# Patient Record
Sex: FEMALE | Race: WHITE | NOT HISPANIC OR LATINO | URBAN - METROPOLITAN AREA
[De-identification: names, ages, dates, MRNs, and addresses within clinical notes are randomized per-mention and may not be internally consistent; named-entity substitution may affect disease eponyms.]

---

## 2022-09-12 ENCOUNTER — APPOINTMENT (RX ONLY)
Dept: URBAN - METROPOLITAN AREA CLINIC 142 | Facility: CLINIC | Age: 72
Setting detail: DERMATOLOGY
End: 2022-09-12

## 2022-09-12 DIAGNOSIS — L29.89 OTHER PRURITUS: ICD-10-CM

## 2022-09-12 DIAGNOSIS — L259 CONTACT DERMATITIS AND OTHER ECZEMA, UNSPECIFIED CAUSE: ICD-10-CM

## 2022-09-12 PROBLEM — L23.9 ALLERGIC CONTACT DERMATITIS, UNSPECIFIED CAUSE: Status: ACTIVE | Noted: 2022-09-12

## 2022-09-12 PROBLEM — L29.8 OTHER PRURITUS: Status: ACTIVE | Noted: 2022-09-12

## 2022-09-12 PROCEDURE — 99212 OFFICE O/P EST SF 10 MIN: CPT | Mod: 25

## 2022-09-12 PROCEDURE — 95044 PATCH/APPLICATION TESTS: CPT

## 2022-09-12 PROCEDURE — ? COUNSELING

## 2022-09-12 PROCEDURE — ? PATCH TESTING

## 2022-09-12 ASSESSMENT — LOCATION SIMPLE DESCRIPTION DERM
LOCATION SIMPLE: LEFT LIP
LOCATION SIMPLE: LEFT UPPER BACK

## 2022-09-12 ASSESSMENT — LOCATION ZONE DERM
LOCATION ZONE: TRUNK
LOCATION ZONE: LIP

## 2022-09-12 ASSESSMENT — LOCATION DETAILED DESCRIPTION DERM
LOCATION DETAILED: LEFT INFERIOR VERMILION LIP
LOCATION DETAILED: LEFT SUPERIOR VERMILION LIP
LOCATION DETAILED: LEFT SUPERIOR MEDIAL UPPER BACK

## 2022-09-12 NOTE — PROCEDURE: PATCH TESTING
Consent: Written consent obtained, risks reviewed including but not limited to rash, itching, allergic reaction, systemic rash, remote possiblity of anaphylaxis to allergen.
Number Of Patches (Maximum Allowable Per Dos By Cms Is 90): 1189 
Post-Care Instructions: I reviewed with the patient in detail post-care instructions. Patient should not sweat, pick at, or get the patches wet for 48 hours.
Detail Level: None

## 2022-09-12 NOTE — HPI: TESTING (PATCH TESTING)
Has Your Rash Been Biopsied Before?: has not been biopsied previously
Have You Had Previous Patch Testing In The Past?: [employer] series
How Severe Is Your Rash At Its Worst?: moderate

## 2022-09-14 ENCOUNTER — APPOINTMENT (RX ONLY)
Dept: URBAN - METROPOLITAN AREA CLINIC 142 | Facility: CLINIC | Age: 72
Setting detail: DERMATOLOGY
End: 2022-09-14

## 2022-09-14 DIAGNOSIS — L29.89 OTHER PRURITUS: ICD-10-CM

## 2022-09-14 DIAGNOSIS — L259 CONTACT DERMATITIS AND OTHER ECZEMA, UNSPECIFIED CAUSE: ICD-10-CM | Status: WORSENING

## 2022-09-14 PROBLEM — L23.5 ALLERGIC CONTACT DERMATITIS DUE TO OTHER CHEMICAL PRODUCTS: Status: ACTIVE | Noted: 2022-09-14

## 2022-09-14 PROBLEM — L29.8 OTHER PRURITUS: Status: ACTIVE | Noted: 2022-09-14

## 2022-09-14 PROCEDURE — 99213 OFFICE O/P EST LOW 20 MIN: CPT

## 2022-09-14 PROCEDURE — ? COUNSELING

## 2022-09-14 PROCEDURE — ? NORTH AMERICAN 80 PATCH TEST READING

## 2022-09-14 ASSESSMENT — PAIN INTENSITY VAS: HOW INTENSE IS YOUR PAIN 0 BEING NO PAIN, 10 BEING THE MOST SEVERE PAIN POSSIBLE?: NO PAIN

## 2022-09-14 ASSESSMENT — LOCATION ZONE DERM: LOCATION ZONE: LIP

## 2022-09-14 ASSESSMENT — SEVERITY ASSESSMENT: SEVERITY: MILD

## 2022-09-14 ASSESSMENT — BSA RASH: BSA RASH: 1

## 2022-09-14 ASSESSMENT — ITCH NUMERIC RATING SCALE: HOW SEVERE IS YOUR ITCHING?: 2

## 2022-09-14 ASSESSMENT — LOCATION SIMPLE DESCRIPTION DERM: LOCATION SIMPLE: LEFT LIP

## 2022-09-14 ASSESSMENT — LOCATION DETAILED DESCRIPTION DERM
LOCATION DETAILED: LEFT SUPERIOR VERMILION LIP
LOCATION DETAILED: LEFT INFERIOR VERMILION LIP

## 2022-09-14 NOTE — PROCEDURE: [employer] 80 PATCH TEST READING
Allergen 41 Reaction: no reaction
Allergen 54 Reaction: 1+
What Reading Time Point?: 48 hour
Name Of Allergen 11: Disperse Red 17
Show Negative Results In The Note?: No
Show Allergen Counseling In The Note?: Yes
Name Of Allergen 54: Isoeugenol
Detail Level: Zone
Number Of Patches Read: [address] [facility]
Allergen 11 Reaction: irritant reaction

## 2022-09-16 ENCOUNTER — APPOINTMENT (RX ONLY)
Dept: URBAN - METROPOLITAN AREA CLINIC 141 | Facility: CLINIC | Age: 72
Setting detail: DERMATOLOGY
End: 2022-09-16

## 2022-09-16 DIAGNOSIS — L259 CONTACT DERMATITIS AND OTHER ECZEMA, UNSPECIFIED CAUSE: ICD-10-CM | Status: WORSENING

## 2022-09-16 PROBLEM — L23.5 ALLERGIC CONTACT DERMATITIS DUE TO OTHER CHEMICAL PRODUCTS: Status: ACTIVE | Noted: 2022-09-16

## 2022-09-16 PROCEDURE — ? COUNSELING

## 2022-09-16 PROCEDURE — ? NORTH AMERICAN 80 PATCH TEST READING

## 2022-09-16 PROCEDURE — 99213 OFFICE O/P EST LOW 20 MIN: CPT

## 2022-09-16 ASSESSMENT — SEVERITY ASSESSMENT: SEVERITY: MILD

## 2022-09-16 ASSESSMENT — LOCATION SIMPLE DESCRIPTION DERM: LOCATION SIMPLE: LEFT LIP

## 2022-09-16 ASSESSMENT — LOCATION DETAILED DESCRIPTION DERM: LOCATION DETAILED: LEFT SUPERIOR VERMILION LIP

## 2022-09-16 ASSESSMENT — PAIN INTENSITY VAS: HOW INTENSE IS YOUR PAIN 0 BEING NO PAIN, 10 BEING THE MOST SEVERE PAIN POSSIBLE?: NO PAIN

## 2022-09-16 ASSESSMENT — ITCH NUMERIC RATING SCALE: HOW SEVERE IS YOUR ITCHING?: 2

## 2022-09-16 ASSESSMENT — LOCATION ZONE DERM: LOCATION ZONE: LIP

## 2022-09-16 NOTE — PROCEDURE: [employer] 80 PATCH TEST READING
Allergen 41 Reaction: no reaction
Allergen 54 Reaction: +/-
What Reading Time Point?: 72 hour
Name Of Allergen 11: Disperse Red 17
Show Negative Results In The Note?: No
Show Allergen Counseling In The Note?: Yes
Allergen 53 Reaction: 1+
Name Of Allergen 54: Isoeugenol
Detail Level: Zone
Number Of Patches Read: [address] [facility]
Allergen 11 Reaction: 2+

## 2022-09-16 NOTE — PROCEDURE: COUNSELING
Patient Specific Counseling (Will Not Stick From Patient To Patient): Patient advise to d/c AQUAPHOR OINTMENT. \\nSamples of Cerave ointment provided - safe list recommendation. \\nWe went over the results of the testing in a great detail. Patient was given the list of lip products that will be able to utilize, \\nFollow up in 8-12 weeks or sooner if needed.
Detail Level: Simple
Detail Level: Detailed

## 2023-01-16 ENCOUNTER — APPOINTMENT (RX ONLY)
Dept: URBAN - METROPOLITAN AREA CLINIC 142 | Facility: CLINIC | Age: 73
Setting detail: DERMATOLOGY
End: 2023-01-16

## 2023-01-16 DIAGNOSIS — L81.4 OTHER MELANIN HYPERPIGMENTATION: ICD-10-CM

## 2023-01-16 DIAGNOSIS — L98.8 OTHER SPECIFIED DISORDERS OF THE SKIN AND SUBCUTANEOUS TISSUE: ICD-10-CM

## 2023-01-16 PROCEDURE — ? BOTOX

## 2023-01-16 PROCEDURE — ? COUNSELING

## 2023-01-16 PROCEDURE — ? FILLERS

## 2023-01-16 PROCEDURE — ? ADDITIONAL NOTES

## 2023-01-16 PROCEDURE — 99212 OFFICE O/P EST SF 10 MIN: CPT

## 2023-01-16 ASSESSMENT — LOCATION DETAILED DESCRIPTION DERM: LOCATION DETAILED: RIGHT UPPER CUTANEOUS LIP

## 2023-01-16 ASSESSMENT — LOCATION SIMPLE DESCRIPTION DERM: LOCATION SIMPLE: RIGHT LIP

## 2023-01-16 ASSESSMENT — LOCATION ZONE DERM: LOCATION ZONE: LIP

## 2023-01-16 NOTE — PROCEDURE: ADDITIONAL NOTES
Render Risk Assessment In Note?: no
Detail Level: Simple
Additional Notes: Discussed in detail with patient that filler and Botox can help with muscles movement and softening of fine lines and wrinkles, but can not fully take care of patient's skin laxity and what she is actually desiring. Patient aware most of her issue are from collagen loss and can be treated with possible facelift. But what she wants done at this point in time is a temporary fix with only softening of deep lines  and wrinkles around mouth, marionette and upper cheeks and decreasing muscle movement around crows and glabella area - and may not necessarily eliminating them completely. Both patient and provider agreed to move on with cosmetic procedure today.

## 2023-01-16 NOTE — PROCEDURE: COUNSELING
Detail Level: Detailed
Patient Specific Counseling (Will Not Stick From Patient To Patient): Cryotherapy as a courtesy - NO CHARGE
Detail Level: Zone
Sunscreen Recommendations: Discussed sunblock should be applied to exposed areas daily, and be reapplied every two hours while exposed . The sunblock should be broad spectrum SPF-50, UVA/UVB and contain Zinc and Titanium Dioxide(Blue Lizard & Marianne Bowl Bum are some good OTC brands). Strongly recommend Elta MD products. Recommend sun protective clothing such as long sleeve UPF protective shirts, wide brim hats, neck covers and sunglasses as it has been proven to prevent further photo damage from the sun.

## 2023-01-16 NOTE — PROCEDURE: BOTOX
Inferior Lateral Orbicularis Oculi Units: 0
Show Lcl Units: No
Detail Level: Detailed
Show Additional Area 2: Yes
Glabellar Complex Units: [address] [facility]
Post-Care Instructions: Patient instructed to not lie down for 4 hours and limit physical activity for 24 hours.
Dilution (U/0.1 Cc): 4
Show Inventory Tab: Show
Additional Area 1 Units: [address] [facility]
Additional Area 1 Location: [facility]
Consent: Written consent obtained. Risks include but not limited to lid/brow ptosis, bruising, swelling, diplopia, temporary effect, incomplete chemical denervation.

## 2023-01-16 NOTE — PROCEDURE: FILLERS
Number Of Syringes (Required For Inventory): 1
Additional Area 3 Volume In Cc: 0
Anesthesia Volume In Cc: 0.5
Include Cannula Information In Note?: No
Filler: Juvederm Ultra XC
Inventory Information: This plan will send filler information to inventory based on the fillers you select. Multiple fillers can be sent but you must ensure you select the appropriate fillers in the inventory tab.
Detail Level: Detailed
Show Inventory Tab: Show
Consent: Written consent obtained. Risks include but not limited to bruising, beading, irregular texture, ulceration, infection, allergic reaction, scar formation, incomplete augmentation, temporary nature, procedural pain.
Filler Comments: Lot [id number] exp 2023-09-11
Post-Care Instructions: Patient instructed to apply ice to reduce swelling.
Map Statment: See [address] for Complete Details
Additional Anesthesia Volume In Cc: 6
Anesthesia Type: 1% lidocaine with epinephrine
Topical Anesthesia?: PLAST (20% benzocaine, 8% lidocaine, 4% tetracaine)

## 2023-01-19 ENCOUNTER — RX ONLY (OUTPATIENT)
Age: 73
Setting detail: RX ONLY
End: 2023-01-19

## 2023-01-19 RX ORDER — DOXYCYCLINE HYCLATE 100 MG/1
CAPSULE, GELATIN COATED ORAL
Qty: 20 | Refills: 0 | Status: ERX | COMMUNITY
Start: 2023-01-19

## 2023-01-19 RX ORDER — DOXYCYCLINE HYCLATE 100 MG/1
CAPSULE, GELATIN COATED ORAL
Qty: 20 | Refills: 0 | Status: ERX

## 2023-01-30 ENCOUNTER — APPOINTMENT (RX ONLY)
Dept: URBAN - METROPOLITAN AREA CLINIC 142 | Facility: CLINIC | Age: 73
Setting detail: DERMATOLOGY
End: 2023-01-30

## 2023-01-30 DIAGNOSIS — L98.8 OTHER SPECIFIED DISORDERS OF THE SKIN AND SUBCUTANEOUS TISSUE: ICD-10-CM

## 2023-01-30 DIAGNOSIS — Z41.9 ENCOUNTER FOR PROCEDURE FOR PURPOSES OTHER THAN REMEDYING HEALTH STATE, UNSPECIFIED: ICD-10-CM

## 2023-01-30 PROCEDURE — ? ADDITIONAL NOTES

## 2023-01-30 PROCEDURE — 99212 OFFICE O/P EST SF 10 MIN: CPT | Mod: NC

## 2023-01-30 PROCEDURE — ? BOTOX

## 2023-01-30 PROCEDURE — ? COUNSELING

## 2023-01-30 ASSESSMENT — LOCATION ZONE DERM
LOCATION ZONE: FACE
LOCATION ZONE: LEG
LOCATION ZONE: LIP

## 2023-01-30 ASSESSMENT — LOCATION SIMPLE DESCRIPTION DERM
LOCATION SIMPLE: LEFT LIP
LOCATION SIMPLE: LEFT CHEEK
LOCATION SIMPLE: RIGHT CHEEK
LOCATION SIMPLE: RIGHT LIP
LOCATION SIMPLE: LEFT POSTERIOR THIGH

## 2023-01-30 ASSESSMENT — LOCATION DETAILED DESCRIPTION DERM
LOCATION DETAILED: RIGHT ORAL COMMISSURE
LOCATION DETAILED: RIGHT INFERIOR CENTRAL MALAR CHEEK
LOCATION DETAILED: LEFT INFERIOR CENTRAL MALAR CHEEK
LOCATION DETAILED: LEFT DISTAL POSTERIOR THIGH
LOCATION DETAILED: LEFT ORAL COMMISSURE

## 2023-01-30 NOTE — PROCEDURE: BOTOX
Left Pupillary Line Units: 0
Show Additional Area 1: Yes
Consent: Written consent obtained. Risks include but not limited to lid/brow ptosis, bruising, swelling, diplopia, temporary effect, incomplete chemical denervation.
Show Ucl Units: No
Incrementing Botox Units: By 0.5 Units
Post-Care Instructions: Patient instructed to not lie down for 4 hours and limit physical activity for 24 hours.
Dilution (U/0.1 Cc): 4
Detail Level: Detailed
Show Inventory Tab: Show
Patient Specific Comments (Will Not Stick From Patient To Patient): No charge per provider [facility]

## 2023-01-30 NOTE — PROCEDURE: ADDITIONAL NOTES
Detail Level: Simple
Render Risk Assessment In Note?: no
Additional Notes: Pt complains and displeased thinks botox didnât work previously and doesnât have full effect she is looking for. \\nProvider will add some units to forehead to drop eyebrows a bit.
Additional Notes: Patient complains and displeased with results stated she expected more, provider discussed in detail at original appointment that the results patient wanted would most likely be unrealistic unless she has a surgical approach due to deep lines around mouth and cheeks. \\nPatient decided to proceed with filler injections last office visit. \\nShe is now coming in to follow up displeased and wanting more filler and correction, provider will try to obtain another syringe for patient and will see her back in office to inject in about 1-2 weeks time.

## 2023-02-08 ENCOUNTER — APPOINTMENT (RX ONLY)
Dept: URBAN - METROPOLITAN AREA CLINIC 142 | Facility: CLINIC | Age: 73
Setting detail: DERMATOLOGY
End: 2023-02-08

## 2023-02-08 DIAGNOSIS — Z41.9 ENCOUNTER FOR PROCEDURE FOR PURPOSES OTHER THAN REMEDYING HEALTH STATE, UNSPECIFIED: ICD-10-CM

## 2023-02-08 PROCEDURE — ? INVENTORY

## 2023-02-08 PROCEDURE — ? COSMETIC CONSULTATION: FILLERS

## 2023-02-08 PROCEDURE — ? JUVEDERM ULTRA XC INJECTION

## 2023-02-08 PROCEDURE — ? ADDITIONAL NOTES

## 2023-02-08 NOTE — PROCEDURE: JUVEDERM ULTRA XC INJECTION
Consent: Written consent obtained. Risks include but not limited to bruising, beading, irregular texture, ulceration, infection, allergic reaction, scar formation, incomplete augmentation, temporary nature, procedural pain.
Additional Area 3 Volume In Cc: 0
Procedural Text: The filler was administered to the treatment areas noted above.
Cheeks Filler Volume In Cc: 0.2
Map Statment: See [address] for Complete Details
Filler: Juvederm Ultra XC
Anesthesia Volume In Cc: 0.5
Show Inventory Tab: Hide
Use Map Statement For Sites (Optional): No
Nasolabial Folds Filler Volume In Cc: 0.4
Additional Anesthesia Volume In Cc: 6
Number Of Syringes (Required For Inventory): 1
Post-Care Instructions: Patient instructed to apply ice to reduce swelling.
Price (Use Numbers Only, No Special Characters Or $): 0.00
Detail Level: Detailed
Anesthesia Type: 1% lidocaine with epinephrine

## 2023-02-08 NOTE — PROCEDURE: ADDITIONAL NOTES
Detail Level: Simple
Additional Notes: Patient aware no charge today. Patient received Juvederm XC sample injection. Once again, we discussed face lift as an option but patient is not interested in surgical options it this time. Recommended follow up in 2 weeks. \\n\\nEducated patient - increase oral hydration for better cosmetic outcome. Patient stated understanding.
Render Risk Assessment In Note?: no

## 2023-02-08 NOTE — PROCEDURE: JUVEDERM ULTRA XC INJECTION
Number Of Syringes (Required For Inventory): 1
Additional Anesthesia Volume In Cc: 6
Lateral Face Filler  Volume In Cc: 0
Nasolabial Folds Filler Volume In Cc: 0.4
Use Map Statement For Sites (Optional): No
Detail Level: Detailed
Anesthesia Type: 1% lidocaine with epinephrine
Show Inventory Tab: Hide
Anesthesia Volume In Cc: 0.5
Procedural Text: The filler was administered to the treatment areas noted above.
Consent: Written consent obtained. Risks include but not limited to bruising, beading, irregular texture, ulceration, infection, allergic reaction, scar formation, incomplete augmentation, temporary nature, procedural pain.
Vermilion Lips Filler Volume In Cc: 0.2
Map Statment: See [address] for Complete Details
Post-Care Instructions: Patient instructed to apply ice to reduce swelling.
Price (Use Numbers Only, No Special Characters Or $): 0.00
Filler: Juvederm Ultra XC